# Patient Record
Sex: FEMALE | Race: BLACK OR AFRICAN AMERICAN | NOT HISPANIC OR LATINO | URBAN - METROPOLITAN AREA
[De-identification: names, ages, dates, MRNs, and addresses within clinical notes are randomized per-mention and may not be internally consistent; named-entity substitution may affect disease eponyms.]

---

## 2019-02-07 ENCOUNTER — APPOINTMENT (RX ONLY)
Dept: URBAN - METROPOLITAN AREA CLINIC 148 | Facility: CLINIC | Age: 49
Setting detail: DERMATOLOGY
End: 2019-02-07

## 2019-02-07 DIAGNOSIS — L0390 CELLULITIS AND ABSCESS OF UNSPECIFIED SITES: ICD-10-CM

## 2019-02-07 DIAGNOSIS — L0391 CELLULITIS AND ABSCESS OF UNSPECIFIED SITES: ICD-10-CM

## 2019-02-07 PROBLEM — L02.411 CUTANEOUS ABSCESS OF RIGHT AXILLA: Status: ACTIVE | Noted: 2019-02-07

## 2019-02-07 PROCEDURE — ? COUNSELING

## 2019-02-07 PROCEDURE — 11900 INJECT SKIN LESIONS </W 7: CPT

## 2019-02-07 PROCEDURE — ? INTRALESIONAL KENALOG

## 2019-02-07 PROCEDURE — ? PRESCRIPTION

## 2019-02-07 PROCEDURE — ? INCISION AND DRAINAGE

## 2019-02-07 PROCEDURE — 10060 I&D ABSCESS SIMPLE/SINGLE: CPT

## 2019-02-07 RX ORDER — DOXYCYCLINE HYCLATE 100 MG/1
CAPSULE, GELATIN COATED ORAL
Qty: 14 | Refills: 0 | Status: ERX | COMMUNITY
Start: 2019-02-07

## 2019-02-07 RX ADMIN — DOXYCYCLINE HYCLATE: 100 CAPSULE, GELATIN COATED ORAL at 19:06

## 2019-02-07 ASSESSMENT — LOCATION DETAILED DESCRIPTION DERM: LOCATION DETAILED: RIGHT AXILLARY VAULT

## 2019-02-07 ASSESSMENT — LOCATION SIMPLE DESCRIPTION DERM: LOCATION SIMPLE: RIGHT AXILLARY VAULT

## 2019-02-07 ASSESSMENT — LOCATION ZONE DERM: LOCATION ZONE: AXILLAE

## 2019-02-07 NOTE — PROCEDURE: INCISION AND DRAINAGE
Dressing: dry sterile dressing
Render Postcare In Note?: No
Anesthesia Type: 1% lidocaine with epinephrine
Epidermal Sutures: 4-0 Ethilon
Detail Level: Detailed
Post-Care Instructions: I reviewed with the patient in detail post-care instructions. Patient should keep wound covered and call the office should any redness, pain, swelling or worsening occur.
Anesthesia Volume In Cc: 0.3
Preparation Text: The area was prepped in the usual clean fashion.
Lesion Type: Cyst
Consent was obtained and risks were reviewed including but not limited to delayed wound healing, infection, need for multiple I and D's, and pain.
Epidermal Closure: simple interrupted
Method: scalpel
Drainage Amount?: minimal
Drainage Type?: bloody
Suture Text: The incision was partially closed with
Wound Care: Petrolatum
Curette Text (Optional): After the contents were expressed a curette was used to partially remove the cyst wall.
Size Of Lesion In Cm (Optional But May Be Required For Some Insurances): 0

## 2019-02-07 NOTE — PROCEDURE: INTRALESIONAL KENALOG
Medical Necessity Clause: This procedure was medically necessary because the lesions that were treated were:
Kenalog Preparation: Kenalog
Concentration Of Solution Injected (Mg/Ml): 5.0
Include Z78.9 (Other Specified Conditions Influencing Health Status) As An Associated Diagnosis?: No
Total Volume Injected (Ccs- Only Use Numbers And Decimals): 0.2
Consent: The risks of atrophy were reviewed with the patient.
X Size Of Lesion In Cm (Optional): 0
Detail Level: Zone

## 2019-02-14 ENCOUNTER — APPOINTMENT (RX ONLY)
Dept: URBAN - METROPOLITAN AREA CLINIC 148 | Facility: CLINIC | Age: 49
Setting detail: DERMATOLOGY
End: 2019-02-14

## 2019-02-14 DIAGNOSIS — L0390 CELLULITIS AND ABSCESS OF UNSPECIFIED SITES: ICD-10-CM

## 2019-02-14 DIAGNOSIS — L0391 CELLULITIS AND ABSCESS OF UNSPECIFIED SITES: ICD-10-CM

## 2019-02-14 PROBLEM — L02.412 CUTANEOUS ABSCESS OF LEFT AXILLA: Status: ACTIVE | Noted: 2019-02-14

## 2019-02-14 PROBLEM — L02.411 CUTANEOUS ABSCESS OF RIGHT AXILLA: Status: ACTIVE | Noted: 2019-02-14

## 2019-02-14 PROCEDURE — ? COUNSELING

## 2019-02-14 PROCEDURE — 11900 INJECT SKIN LESIONS </W 7: CPT | Mod: 79

## 2019-02-14 PROCEDURE — ? INTRALESIONAL KENALOG

## 2019-02-14 ASSESSMENT — LOCATION DETAILED DESCRIPTION DERM
LOCATION DETAILED: RIGHT AXILLARY VAULT
LOCATION DETAILED: LEFT AXILLARY VAULT

## 2019-02-14 ASSESSMENT — LOCATION SIMPLE DESCRIPTION DERM
LOCATION SIMPLE: RIGHT AXILLARY VAULT
LOCATION SIMPLE: LEFT AXILLARY VAULT

## 2019-02-14 ASSESSMENT — LOCATION ZONE DERM: LOCATION ZONE: AXILLAE

## 2019-02-14 NOTE — PROCEDURE: INTRALESIONAL KENALOG
Include Z78.9 (Other Specified Conditions Influencing Health Status) As An Associated Diagnosis?: No
Concentration Of Solution Injected (Mg/Ml): 5.0
Kenalog Preparation: Kenalog
Total Volume Injected (Ccs- Only Use Numbers And Decimals): 0.4
Consent: The risks of atrophy were reviewed with the patient.
X Size Of Lesion In Cm (Optional): 0
Detail Level: Zone
Medical Necessity Clause: This procedure was medically necessary because the lesions that were treated were:

## 2019-03-14 ENCOUNTER — APPOINTMENT (RX ONLY)
Dept: URBAN - METROPOLITAN AREA CLINIC 148 | Facility: CLINIC | Age: 49
Setting detail: DERMATOLOGY
End: 2019-03-14

## 2019-03-14 DIAGNOSIS — L0390 CELLULITIS AND ABSCESS OF UNSPECIFIED SITES: ICD-10-CM | Status: RESOLVED

## 2019-03-14 DIAGNOSIS — L72.8 OTHER FOLLICULAR CYSTS OF THE SKIN AND SUBCUTANEOUS TISSUE: ICD-10-CM

## 2019-03-14 DIAGNOSIS — L0391 CELLULITIS AND ABSCESS OF UNSPECIFIED SITES: ICD-10-CM | Status: RESOLVED

## 2019-03-14 PROBLEM — L02.412 CUTANEOUS ABSCESS OF LEFT AXILLA: Status: ACTIVE | Noted: 2019-03-14

## 2019-03-14 PROBLEM — L02.411 CUTANEOUS ABSCESS OF RIGHT AXILLA: Status: ACTIVE | Noted: 2019-03-14

## 2019-03-14 PROCEDURE — 99213 OFFICE O/P EST LOW 20 MIN: CPT

## 2019-03-14 PROCEDURE — ? ADDITIONAL NOTES

## 2019-03-14 PROCEDURE — ? COUNSELING

## 2019-03-14 PROCEDURE — ? INVENTORY

## 2019-03-14 ASSESSMENT — LOCATION DETAILED DESCRIPTION DERM
LOCATION DETAILED: LEFT AXILLARY VAULT
LOCATION DETAILED: RIGHT AXILLARY VAULT

## 2019-03-14 ASSESSMENT — LOCATION SIMPLE DESCRIPTION DERM
LOCATION SIMPLE: RIGHT AXILLARY VAULT
LOCATION SIMPLE: LEFT AXILLARY VAULT

## 2019-03-14 ASSESSMENT — LOCATION ZONE DERM: LOCATION ZONE: AXILLAE

## 2021-07-12 ENCOUNTER — TELEPHONE ENCOUNTER (OUTPATIENT)
Dept: URBAN - METROPOLITAN AREA CLINIC 9 | Facility: CLINIC | Age: 51
End: 2021-07-12

## 2021-07-19 ENCOUNTER — OFFICE VISIT (OUTPATIENT)
Dept: URBAN - METROPOLITAN AREA CLINIC 7 | Facility: CLINIC | Age: 51
End: 2021-07-19

## 2021-07-22 ENCOUNTER — OFFICE VISIT (OUTPATIENT)
Dept: URBAN - METROPOLITAN AREA SURGERY CENTER 5 | Facility: SURGERY CENTER | Age: 51
End: 2021-07-22

## 2021-08-27 ENCOUNTER — OFFICE VISIT (OUTPATIENT)
Dept: URBAN - METROPOLITAN AREA SURGERY CENTER 5 | Facility: SURGERY CENTER | Age: 51
End: 2021-08-27

## 2021-09-29 ENCOUNTER — TELEPHONE ENCOUNTER (OUTPATIENT)
Dept: URBAN - METROPOLITAN AREA CLINIC 9 | Facility: CLINIC | Age: 51
End: 2021-09-29

## 2022-06-16 ENCOUNTER — APPOINTMENT (RX ONLY)
Dept: URBAN - METROPOLITAN AREA CLINIC 333 | Facility: CLINIC | Age: 52
Setting detail: DERMATOLOGY
End: 2022-06-16

## 2022-06-16 DIAGNOSIS — Z41.9 ENCOUNTER FOR PROCEDURE FOR PURPOSES OTHER THAN REMEDYING HEALTH STATE, UNSPECIFIED: ICD-10-CM

## 2022-06-16 PROCEDURE — ? HYDRAFACIAL

## 2022-06-16 NOTE — PROCEDURE: HYDRAFACIAL
Price (Use Numbers Only, No Special Characters Or $): 152 Price (Use Numbers Only, No Special Characters Or $): 849

## 2022-07-06 ENCOUNTER — APPOINTMENT (RX ONLY)
Dept: URBAN - METROPOLITAN AREA CLINIC 333 | Facility: CLINIC | Age: 52
Setting detail: DERMATOLOGY
End: 2022-07-06

## 2022-07-06 DIAGNOSIS — Z41.9 ENCOUNTER FOR PROCEDURE FOR PURPOSES OTHER THAN REMEDYING HEALTH STATE, UNSPECIFIED: ICD-10-CM

## 2022-07-06 PROCEDURE — ? VI PEEL

## 2022-07-06 NOTE — PROCEDURE: VI PEEL
Post-Care Instructions: Day One: Do not put anything on your skin for 4 hours (including sunscreen). \\n 4 hours after VI Peel (At _____________), apply Post Treatment Repair (in the post peel kit) over the peeled areas. \\nApply the SPF 50 over the Post Treatment Repair.\\n\\nNight One: 1 hour before bedtime\\n    Step 1: Cleanse skin with half of the  Vi Derm Gentle Cleanser. Avoid hot water. Gently pat skin dry.\\n    Step 2: Apply Precision Plus Towelette (White Towelette) to all areas of the skin. Use firm pressure.  DO NOT WASH OFF\\n    Step 3: Wait 30 minutes then apply Post Peel Towelette (Green Towelette) to all areas of the skin. Use firm pressure. DO NOT WASH OFF\\n    Step 4: Wait 10 min and then apply a thin layer of the Post Treatment Repair. \\n    Step 5: You are now ready for bed. Sweet Dreams!\\n      \\nDay Two: \\n    Step 1: Cleanse your skin with the other half of the Vi Derm Gentle Cleanser. Avoid hot water. Gently pat skin dry.\\n    Step 2: Apply a thin layer of Post Treatment Repair. Reapply throughout the day. (late morning, lunch and mid evening)\\n    Step 3: Apply Vi Derm SPF 50+ sunscreen. Reapply every couple of hours. Even if it is not [place] outside!                                                                                                                                                  \\n\\nNight Two: 1 hour before bedtime\\n    Step 1: Cleanse your skin with half of the Vi Derm Gentle cleanser. Avoid hot water. Gently pat skin dry.\\n    Step 2:  Apply Precision Peel Towelette (White Towelette) to all areas of skin. DO NOT WASH OFF\\n    Step 3: Wait 30 minutes, then apply the Post Peel Towelette (Green Towelette).  DO NOT WASH OFF\\n    Step 4: Wait 10 min and then apply a thin layer of the Post Treatment Repair. \\n    Step 5: You are now ready for bed. Sweet Dreams!\\n        \\nDay Three:\\n    Today is when the peeling phase starts usually around the mouth area peeling outwards.\\n     Step 1: Cleanse with half of the Vi Derm gentle cleanser.\\n     Step 2: Apply thin layer of post treatment repair at least 3 times per day , more often if needed.\\n     Step 3: Apply  your Vi Derm SPF 50 and try to avoid the sun during the peeling phase. Reapply every 2 hours.\\n\\nDay Four - Seven\\n     You will wash your face morning and night with a gentle cleanser.\\n     You will continue using the Post Treatment Repair until you run out or until you come back to see me for your next appointment.\\n     Remember to wear Vi Derm SPF50 every day and you can reapply with our SPF Brush.\\n     You can wear make-up throughout the peeling process if you desire.\\n     After all peeling is complete you may resume your normal skin care regimen.\\n\\nPlease note the following:\\n· Never peel or force the skin off, you will scar.  Your skin will naturally slough off when washed.  You may take small scissors and trim the peeling skin.  Please be careful when doing so.\\n· If at anytime the Post Treatment Repair or sunscreen or a combination of both irritate your skin stop using them immediately and call our office at [phone]. Post-Care Instructions: Day One: Do not put anything on your skin for 4 hours (including sunscreen). \\n 4 hours after VI Peel (At _____________), apply Post Treatment Repair (in the post peel kit) over the peeled areas. \\nApply the SPF 50 over the Post Treatment Repair.\\n\\nNight One: 1 hour before bedtime\\n    Step 1: Cleanse skin with half of the  Vi Derm Gentle Cleanser. Avoid hot water. Gently pat skin dry.\\n    Step 2: Apply Precision Plus Towelette (White Towelette) to all areas of the skin. Use firm pressure.  DO NOT WASH OFF\\n    Step 3: Wait 30 minutes then apply Post Peel Towelette (Green Towelette) to all areas of the skin. Use firm pressure. DO NOT WASH OFF\\n    Step 4: Wait 10 min and then apply a thin layer of the Post Treatment Repair. \\n    Step 5: You are now ready for bed. Sweet Dreams!\\n      \\nDay Two: \\n    Step 1: Cleanse your skin with the other half of the Vi Derm Gentle Cleanser. Avoid hot water. Gently pat skin dry.\\n    Step 2: Apply a thin layer of Post Treatment Repair. Reapply throughout the day. (late morning, lunch and mid evening)\\n    Step 3: Apply Vi Derm SPF 50+ sunscreen. Reapply every couple of hours. Even if it is not [place] outside!                                                                                                                                                  \\n\\nNight Two: 1 hour before bedtime\\n    Step 1: Cleanse your skin with half of the Vi Derm Gentle cleanser. Avoid hot water. Gently pat skin dry.\\n    Step 2:  Apply Precision Peel Towelette (White Towelette) to all areas of skin. DO NOT WASH OFF\\n    Step 3: Wait 30 minutes, then apply the Post Peel Towelette (Green Towelette).  DO NOT WASH OFF\\n    Step 4: Wait 10 min and then apply a thin layer of the Post Treatment Repair. \\n    Step 5: You are now ready for bed. Sweet Dreams!\\n        \\nDay Three:\\n    Today is when the peeling phase starts usually around the mouth area peeling outwards.\\n     Step 1: Cleanse with half of the Vi Derm gentle cleanser.\\n     Step 2: Apply thin layer of post treatment repair at least 3 times per day , more often if needed.\\n     Step 3: Apply  your Vi Derm SPF 50 and try to avoid the sun during the peeling phase. Reapply every 2 hours.\\n\\nDay Four - Seven\\n     You will wash your face morning and night with a gentle cleanser.\\n     You will continue using the Post Treatment Repair until you run out or until you come back to see me for your next appointment.\\n     Remember to wear Vi Derm SPF50 every day and you can reapply with our SPF Brush.\\n     You can wear make-up throughout the peeling process if you desire.\\n     After all peeling is complete you may resume your normal skin care regimen.\\n\\nPlease note the following:\\n· Never peel or force the skin off, you will scar.  Your skin will naturally slough off when washed.  You may take small scissors and trim the peeling skin.  Please be careful when doing so.\\n· If at anytime the Post Treatment Repair or sunscreen or a combination of both irritate your skin stop using them immediately and call our office at [phone].

## 2022-07-30 ENCOUNTER — TELEPHONE ENCOUNTER (OUTPATIENT)
Age: 52
End: 2022-07-30

## 2022-07-31 ENCOUNTER — TELEPHONE ENCOUNTER (OUTPATIENT)
Age: 52
End: 2022-07-31

## 2025-01-08 ENCOUNTER — APPOINTMENT (OUTPATIENT)
Dept: URBAN - METROPOLITAN AREA CLINIC 148 | Facility: CLINIC | Age: 55
Setting detail: DERMATOLOGY
End: 2025-01-08

## 2025-01-08 DIAGNOSIS — S0092XA BLISTER OF FACE, NECK, AND SCALP EXCEPT EYE, WITHOUT MENTION OF INFECTION: ICD-10-CM | Status: WORSENING

## 2025-01-08 DIAGNOSIS — S00522A BLISTER OF FACE, NECK, AND SCALP EXCEPT EYE, WITHOUT MENTION OF INFECTION: ICD-10-CM | Status: WORSENING

## 2025-01-08 DIAGNOSIS — S0002XA BLISTER OF FACE, NECK, AND SCALP EXCEPT EYE, WITHOUT MENTION OF INFECTION: ICD-10-CM | Status: WORSENING

## 2025-01-08 DIAGNOSIS — S0032XA BLISTER OF FACE, NECK, AND SCALP EXCEPT EYE, WITHOUT MENTION OF INFECTION: ICD-10-CM | Status: WORSENING

## 2025-01-08 DIAGNOSIS — S1012XA BLISTER OF FACE, NECK, AND SCALP EXCEPT EYE, WITHOUT MENTION OF INFECTION: ICD-10-CM | Status: WORSENING

## 2025-01-08 DIAGNOSIS — S1092XA BLISTER OF FACE, NECK, AND SCALP EXCEPT EYE, WITHOUT MENTION OF INFECTION: ICD-10-CM | Status: WORSENING

## 2025-01-08 DIAGNOSIS — S00521A BLISTER OF FACE, NECK, AND SCALP EXCEPT EYE, WITHOUT MENTION OF INFECTION: ICD-10-CM | Status: WORSENING

## 2025-01-08 DIAGNOSIS — S00429A BLISTER OF FACE, NECK, AND SCALP EXCEPT EYE, WITHOUT MENTION OF INFECTION: ICD-10-CM | Status: WORSENING

## 2025-01-08 PROBLEM — L30.9 DERMATITIS, UNSPECIFIED: Status: ACTIVE | Noted: 2025-01-08

## 2025-01-08 PROCEDURE — 10060 I&D ABSCESS SIMPLE/SINGLE: CPT

## 2025-01-08 PROCEDURE — ? PRESCRIPTION

## 2025-01-08 PROCEDURE — ? INCISION AND DRAINAGE

## 2025-01-08 PROCEDURE — ? PRESCRIPTION MEDICATION MANAGEMENT

## 2025-01-08 PROCEDURE — ? ORDER TESTS

## 2025-01-08 PROCEDURE — ? COUNSELING

## 2025-01-08 RX ORDER — GENTAMICIN SULFATE 1 MG/G
OINTMENT TOPICAL
Qty: 30 | Refills: 1 | Status: ERX | COMMUNITY
Start: 2025-01-08

## 2025-01-08 RX ADMIN — GENTAMICIN SULFATE: 1 OINTMENT TOPICAL at 00:00

## 2025-01-08 ASSESSMENT — LOCATION SIMPLE DESCRIPTION DERM: LOCATION SIMPLE: RIGHT ANKLE

## 2025-01-08 ASSESSMENT — LOCATION DETAILED DESCRIPTION DERM: LOCATION DETAILED: RIGHT POSTERIOR ANKLE

## 2025-01-08 ASSESSMENT — LOCATION ZONE DERM: LOCATION ZONE: LEG

## 2025-01-08 NOTE — PROCEDURE: INCISION AND DRAINAGE
Detail Level: Detailed
Lesion Type: Abscess
Method: 11 blade
Curette: Yes
Include Sutures?: No
Anesthesia Type: 1% Xylocaine with epinephrine
Anesthesia Volume In Cc: 1
Size Of Lesion In Cm (Optional But May Be Required For Some Insurances): 0
Wound Care: Bacitracin
Dressing: dry sterile dressing
Epidermal Sutures: 4-0 Ethilon
Epidermal Closure: simple interrupted
Suture Text: The incision was partially closed with
Preparation Text: The area was prepped in the usual clean fashion.
Curette Text (Optional): After the contents were expressed a curette/forceps was used to partially remove the cyst wall.
Medical Necessity Clause: The procedure was medically necessary due to one or more of the following: infection, severe pain, erythema, and warmth. These symptoms are either too severe to respond to conservative measures or have failed conservative measures, and, therefore, procedural intervention is medically indicated
Consent was obtained and risks were reviewed including but not limited to delayed wound healing, infection, need for multiple I and D's, and pain.
Post-Care Instructions: I reviewed with the patient in detail post-care instructions. Patient should keep wound covered and call the office should any redness, pain, swelling or worsening occur.

## 2025-01-08 NOTE — PROCEDURE: PRESCRIPTION MEDICATION MANAGEMENT
Initiate Treatment: Gentamicin ointment as directed
Detail Level: Detailed
Render In Strict Bullet Format?: No
Plan: If recurs or patient gets a new area, she is to [facility] for biopsy. Pt is amenable to this plan

## 2025-01-08 NOTE — PROCEDURE: ORDER TESTS
Billing Type: Third-Party Bill
Performing Laboratory: [id number]
Bill For Surgical Tray: no
Expected Date Of Service: 01/08/2025